# Patient Record
Sex: FEMALE | Race: WHITE | NOT HISPANIC OR LATINO | URBAN - METROPOLITAN AREA
[De-identification: names, ages, dates, MRNs, and addresses within clinical notes are randomized per-mention and may not be internally consistent; named-entity substitution may affect disease eponyms.]

---

## 2017-03-03 ENCOUNTER — EMERGENCY (EMERGENCY)
Facility: HOSPITAL | Age: 67
LOS: 1 days | Discharge: HOME | End: 2017-03-03
Attending: EMERGENCY MEDICINE | Admitting: EMERGENCY MEDICINE
Payer: COMMERCIAL

## 2017-03-03 VITALS
HEART RATE: 84 BPM | OXYGEN SATURATION: 99 % | SYSTOLIC BLOOD PRESSURE: 132 MMHG | DIASTOLIC BLOOD PRESSURE: 85 MMHG | RESPIRATION RATE: 17 BRPM

## 2017-03-03 VITALS
SYSTOLIC BLOOD PRESSURE: 138 MMHG | TEMPERATURE: 97 F | HEART RATE: 89 BPM | RESPIRATION RATE: 18 BRPM | DIASTOLIC BLOOD PRESSURE: 88 MMHG | HEIGHT: 67 IN | OXYGEN SATURATION: 100 % | WEIGHT: 160.06 LBS

## 2017-03-03 DIAGNOSIS — R55 SYNCOPE AND COLLAPSE: ICD-10-CM

## 2017-03-03 DIAGNOSIS — I10 ESSENTIAL (PRIMARY) HYPERTENSION: ICD-10-CM

## 2017-03-03 DIAGNOSIS — E78.00 PURE HYPERCHOLESTEROLEMIA, UNSPECIFIED: ICD-10-CM

## 2017-03-03 DIAGNOSIS — E78.5 HYPERLIPIDEMIA, UNSPECIFIED: ICD-10-CM

## 2017-03-03 LAB
ALBUMIN SERPL ELPH-MCNC: 3.9 G/DL — SIGNIFICANT CHANGE UP (ref 3.4–5)
ALP SERPL-CCNC: 46 U/L — SIGNIFICANT CHANGE UP (ref 40–120)
ALT FLD-CCNC: 17 U/L — SIGNIFICANT CHANGE UP (ref 12–42)
ANION GAP SERPL CALC-SCNC: 9 MMOL/L — SIGNIFICANT CHANGE UP (ref 9–16)
AST SERPL-CCNC: 15 U/L — SIGNIFICANT CHANGE UP (ref 15–37)
BILIRUB SERPL-MCNC: 0.4 MG/DL — SIGNIFICANT CHANGE UP (ref 0.2–1.2)
BUN SERPL-MCNC: 21 MG/DL — SIGNIFICANT CHANGE UP (ref 7–23)
CALCIUM SERPL-MCNC: 8.9 MG/DL — SIGNIFICANT CHANGE UP (ref 8.5–10.5)
CHLORIDE SERPL-SCNC: 106 MMOL/L — SIGNIFICANT CHANGE UP (ref 96–108)
CK MB BLD-MCNC: 0.86 % — SIGNIFICANT CHANGE UP
CK MB CFR SERPL CALC: 0.9 NG/ML — SIGNIFICANT CHANGE UP (ref 0.5–3.6)
CK SERPL-CCNC: 105 U/L — SIGNIFICANT CHANGE UP (ref 26–192)
CO2 SERPL-SCNC: 26 MMOL/L — SIGNIFICANT CHANGE UP (ref 22–31)
CREAT SERPL-MCNC: 0.83 MG/DL — SIGNIFICANT CHANGE UP (ref 0.5–1.3)
GLUCOSE SERPL-MCNC: 103 MG/DL — HIGH (ref 70–99)
HCT VFR BLD CALC: 40.1 % — SIGNIFICANT CHANGE UP (ref 34.5–45)
HGB BLD-MCNC: 14 G/DL — SIGNIFICANT CHANGE UP (ref 11.5–15.5)
MCHC RBC-ENTMCNC: 32.2 PG — SIGNIFICANT CHANGE UP (ref 27–34)
MCHC RBC-ENTMCNC: 34.9 G/DL — SIGNIFICANT CHANGE UP (ref 32–36)
MCV RBC AUTO: 92.2 FL — SIGNIFICANT CHANGE UP (ref 80–100)
PLATELET # BLD AUTO: 255 K/UL — SIGNIFICANT CHANGE UP (ref 150–400)
POTASSIUM SERPL-MCNC: 4.1 MMOL/L — SIGNIFICANT CHANGE UP (ref 3.5–5.3)
POTASSIUM SERPL-SCNC: 4.1 MMOL/L — SIGNIFICANT CHANGE UP (ref 3.5–5.3)
PROT SERPL-MCNC: 8.4 G/DL — HIGH (ref 6.4–8.2)
RBC # BLD: 4.35 M/UL — SIGNIFICANT CHANGE UP (ref 3.8–5.2)
RBC # FLD: 12.3 % — SIGNIFICANT CHANGE UP (ref 10.3–16.9)
SODIUM SERPL-SCNC: 141 MMOL/L — SIGNIFICANT CHANGE UP (ref 132–145)
TROPONIN I SERPL-MCNC: <0.017 NG/ML — LOW (ref 0.02–0.06)
WBC # BLD: 7.3 K/UL — SIGNIFICANT CHANGE UP (ref 3.8–10.5)
WBC # FLD AUTO: 7.3 K/UL — SIGNIFICANT CHANGE UP (ref 3.8–10.5)

## 2017-03-03 PROCEDURE — 99285 EMERGENCY DEPT VISIT HI MDM: CPT | Mod: 25

## 2017-03-03 PROCEDURE — 93010 ELECTROCARDIOGRAM REPORT: CPT | Mod: NC

## 2017-03-03 RX ORDER — SODIUM CHLORIDE 9 MG/ML
1000 INJECTION INTRAMUSCULAR; INTRAVENOUS; SUBCUTANEOUS ONCE
Qty: 0 | Refills: 0 | Status: COMPLETED | OUTPATIENT
Start: 2017-03-03 | End: 2017-03-03

## 2017-03-03 RX ADMIN — SODIUM CHLORIDE 1000 MILLILITER(S): 9 INJECTION INTRAMUSCULAR; INTRAVENOUS; SUBCUTANEOUS at 14:23

## 2017-03-03 NOTE — ED PROVIDER NOTE - OBJECTIVE STATEMENT
66 yo F w/ Hx of HLD and HTN presents to ED for evaluation s/p syncopal episode at 4:30 AM this morning. [facility] [place] [facility] when she reports sudden onset of dizziness with cold sweat after standing from the flat bed. Pt walked to the bathroom, locked the door and next remembers waking up on the floor. No known head trauma, CP, SOB, N/V, bowel/urine incontinence, abrasions/lacerations. Pt stood up, used the bathroom without issue and walked back to her seat. No Hx of DVT/PE in the past; pt does have Hx of prior vasovagal syncope on a plane in the past. Pt ate breakfast and notes symptoms improved. Pt was told by friend (a MD)  to come into the ED for evaluation. Pt did not take her HTN medication this morning. Pt notes ongoing URI symptoms for the past 6 weeks; took antibiotics the 1st week with no improvement of symptoms. 68 yo F w/ Hx of HLD and HTN presents to ED for evaluation s/p syncopal episode at 4:30 AM this morning. Pt was on the red eye flight to [facility] from [place] this morning when she reports sudden onset of dizziness with cold sweat after standing from the flat bed after being awoken - states she was sleeping on flat bed in business class. Pt walked to the bathroom, locked the door and next remembers waking up on the floor. No head trauma, CP, SOB, N/V, bowel/urinary incontinence, abrasions/lacerations. Pt stood up, used the bathroom without issue and walked back to her seat. No Hx of DVT/PE in the past; pt does have Hx of prior vasovagal syncope on a plane in the past. Pt ate breakfast and notes symptoms improved. Pt was told by friend (a MD)  to come into the ED for evaluation. Pt did not take her HTN medication this morning. Pt notes resolving URI symptoms. Hx vasovagal syncope in the past.

## 2017-03-03 NOTE — ED PROVIDER NOTE - NEUROLOGICAL, MLM
Alert and oriented, no focal deficits, no motor or sensory deficits. Alert and oriented, no focal deficits, no motor or sensory deficits. Steady gait. strength 5/5 x 4.

## 2017-03-03 NOTE — ED PROVIDER NOTE - MEDICAL DECISION MAKING DETAILS
Pt with syncopal episode this morning. Pt notes symptoms improved prior to arrival. EKG ordered. Pt instructed to follow with cardiology upon return to [place]. return precautions discussed.  PA STATEMENT: I personally performed the services described in the documentation, reviewed the documentation recorded by the scribe in my presence and it accurately and completely records my words and action. Pt with syncopal episode this morning, probable vasovagal, asymptomatic now, will check labs, orthostatics, IV fluids, reassess.   PA STATEMENT: I personally performed the services described in the documentation, reviewed the documentation recorded by the scribe in my presence and it accurately and completely records my words and action.

## 2017-03-03 NOTE — ED ADULT NURSE NOTE - OBJECTIVE STATEMENT
Pt states she was on a 'red eye' flying into [place] this morning, when she passed out on the plane. +LOC unsure if she hit her head. Currently denies any chest pain, no shortness of breath, denies any pain or change in baseline at this moment. Will continue to monitor.

## 2017-03-03 NOTE — ED PROVIDER NOTE - ATTENDING CONTRIBUTION TO CARE
female pt, hx of htn, hld, had a syncope episode earlier today, now is feeling better, had no chest pain, no sob, no abd pain, no vomiting. EKG NSR, non ischemic, normal exam - neuro, lungs cardiac. Plan: labs, IVfs, monitor, BP.

## 2017-03-03 NOTE — ED ADULT TRIAGE NOTE - CHIEF COMPLAINT QUOTE
here for an episode of syncope while on an air plane at 4:30am today- states this has happened before- h/o htn and high cholesterol Pt is A+Ox3 denies cp, sob, dizziness

## 2017-03-03 NOTE — ED PROVIDER NOTE - NS ED MD SCRIBE ATTENDING SCRIBE SECTIONS
CONSULTATIONS/SHIFT CHANGE/VITAL SIGNS( Pullset)/PAST MEDICAL/SURGICAL/SOCIAL HISTORY/HIV/DISPOSITION/PROGRESS NOTE/PHYSICAL EXAM/HISTORY OF PRESENT ILLNESS/INTAKE ASSESSMENT/SCREENINGS/RESULTS/REVIEW OF SYSTEMS

## 2017-03-03 NOTE — ED PROVIDER NOTE - PROGRESS NOTE DETAILS
probable vasovagal syncope, patient feeling better, tolerating PO, asymptomatic, labs unremarkable, she will follow up with PMD in [place] when she gets back in a few days, return precautions reviewed.

## 2017-03-03 NOTE — ED PROVIDER NOTE - HEAD, MLM
Head is atraumatic. Head shape is symmetrical. Head is atraumatic. Head shape is symmetrical. No signs of trauma, no cervical tenderness or bony stepoffs.

## 2023-05-09 NOTE — ED PROVIDER NOTE - MUSCULOSKELETAL NEGATIVE STATEMENT, MLM
no back pain, no gout, no musculoskeletal pain, no neck pain, and no weakness. Albendazole Counseling:  I discussed with the patient the risks of albendazole including but not limited to cytopenia, kidney damage, nausea/vomiting and severe allergy.  The patient understands that this medication is being used in an off-label manner.